# Patient Record
Sex: FEMALE | Race: WHITE | NOT HISPANIC OR LATINO
[De-identification: names, ages, dates, MRNs, and addresses within clinical notes are randomized per-mention and may not be internally consistent; named-entity substitution may affect disease eponyms.]

---

## 2024-03-12 ENCOUNTER — APPOINTMENT (OUTPATIENT)
Dept: ORTHOPEDIC SURGERY | Facility: CLINIC | Age: 54
End: 2024-03-12

## 2024-03-14 PROBLEM — Z00.00 ENCOUNTER FOR PREVENTIVE HEALTH EXAMINATION: Status: ACTIVE | Noted: 2024-03-14

## 2024-03-15 ENCOUNTER — APPOINTMENT (OUTPATIENT)
Dept: ORTHOPEDIC SURGERY | Facility: CLINIC | Age: 54
End: 2024-03-15
Payer: COMMERCIAL

## 2024-03-15 VITALS
DIASTOLIC BLOOD PRESSURE: 73 MMHG | HEART RATE: 67 BPM | WEIGHT: 230 LBS | OXYGEN SATURATION: 95 % | BODY MASS INDEX: 42.33 KG/M2 | SYSTOLIC BLOOD PRESSURE: 121 MMHG | HEIGHT: 62 IN

## 2024-03-15 DIAGNOSIS — M25.462 EFFUSION, LEFT KNEE: ICD-10-CM

## 2024-03-15 PROCEDURE — 99203 OFFICE O/P NEW LOW 30 MIN: CPT | Mod: 25

## 2024-03-15 PROCEDURE — 20611 DRAIN/INJ JOINT/BURSA W/US: CPT | Mod: LT

## 2024-03-15 NOTE — HISTORY OF PRESENT ILLNESS
[de-identified] : CARMELO MELENDREZ is a 53 year old female who presents with left knee pain. States the onset of pain was 4 weeks ago Was caring for her mother who started to fall. Patient grabbed her mother. Felt pain and pop in her left knee. Went to ED at [facility]. XR was negative for fracture. Saw Ortho who aspirated knee (40 mL) and gave her cortisone injection. Prescribed Diclofenac. Pain is anteromedial Pain is nonradiating. There is associated swelling and stiffness There is no associated numbness, paraesthesia or weakness. Exacerbating factors are standing, walking for prolonged periods, climbing and descending stairs, rising from seated position. Patient ambulates independently. Has not tried PT

## 2024-03-15 NOTE — PHYSICAL EXAM
[de-identified] : General: Well-nourished, well-developed, alert, and in no acute distress. Head: Normocephalic. Eyes: Pupils equal, extraocular muscles intact, normal sclera. Nose: No nasal discharge. Cardiovascular: Extremities are warm and well perfused. Distal pulses are symmetric bilaterally. Respiratory: No labored breathing. Extremities: Sensation is intact distally bilaterally. Distal pulses are symmetric bilaterally Lymphatic: No regional lymphadenopathy, no lymphedema Neurologic: No focal deficits Skin: Normal skin color, texture, and turgor Psychiatric: Normal affect  MSK: Examination of [left] knee:   Gait antalgic Genu valgum alignment Moderate effusion Medial soft tissue swelling No erythema, hematoma Tender to palpation: medial joint line, medial soft tissue swelling Nontender to palpation: lateral joint line, medial patellar facet, lateral patellar facet, quad tendon, patellar tendon, pes, Gerdy's tubercle, tibial tuberosity, popliteal fossa, hamstrings, ITB No warmth No Baker's cyst palpable ROM: 0-[100] Mild patellar crepitus   Log roll negative Lachman negative Anterior drawer negative Posterior drawer negative Varus/valgus stress negative at 0 and 30 deg Ant negative Extensor mechanism intact   Examination of [right] knee:   No effusion, erythema, hematoma or skin lesion Nontender to palpation: medial joint line, lateral joint line, medial patellar facet, lateral patellar facet, quad tendon, patellar tendon, pes, Gerdy's tubercle, tibial tuberosity, popliteal fossa, hamstrings, ITB No warmth No Baker's cyst palpable ROM: 0-110 Mild patellar crepitus   Log roll negative Lachman negative Anterior drawer negative Posterior drawer negative Varus/valgus stress negative at 0 and 30 deg Ant negative Extensor mechanism intact   Sensation is intact to light touch over the superficial and deep peroneal nerve distributions and the posterior tibial nerve distribution. Capillary refill is less than two seconds. Posterior tibial and dorsalis pedis pulses 2+ equal bilaterally. No calf swelling or tenderness bilaterally. Strength testing shows hip flexion 5/5, hip adduction 5/5, hip abduction 5/5, knee extension 5/5, knee flexion 5/5, dorsiflexion 5/5, plantar flexion 5/5, EHL 5/5 Reflexes: Patellar 2+, Achilles 2+

## 2024-03-15 NOTE — PROCEDURE
[de-identified] :  Ultrasound guided aspiration and intra-articular steroid injection of left knee:  Following a discussion of the risks (bleeding, infection) and benefits, verbal consent was obtained. Patient placed in supine position. The suprapatellar recess and surrounding structures (patella, femur, quadriceps tendon, suprapatellar fat pad and prefemoral fat pad) were visualized in SAX and LAX with Sonosite 15 Hz linear transducer.   Superolateral knee was anaesthetised with ethyl chloride spray. Under strict sterile technique the right knee was prepped with chlorhexadine. Using ultrasound guidance (superolateral approach) a 25 G 1.5 inch needle was inserted and after negative aspiration, 3mL of 0.5% Bupivacaine and 2mL of 1 % lidocaine was injected subcutaneously and along track into the suprapatellar recess. After adequate anaesthesia, a 20 G 1.5 inch needle was inserted into the suprapatellar recess and after aspiration of 26 mL of clear, serous fluid, 1mL/10mg Dexamethasone, 4mL 0.5% Bupivacaine and 2mL 1% lidocaine was injected intra-articularly.    The use of direct ultrasound visualization was necessary to increase patient safety by identifying and avoiding inadvertent needle placement within the neurovascular and osteochondral structures. Additionally, the increased accuracy of needle placement may improve therapeutic efficacy and allow higher diagnostic specificity when evaluating the effectiveness of this injection.  The patient tolerated the procedure well. Post-injection instructions given (no strenuous activity for 48 hours, ice, elevate). Patient verbalized understanding.

## 2024-03-15 NOTE — ASSESSMENT
[FreeTextEntry1] : CARMELO MELENDREZ is a 53 year old female with left knee pain. I discussed with the patient that their symptoms, signs, and imaging are most consistent with osteoarthritis, possible lipoma and possible meniscus tear. We reviewed the natural history of this condition and treatment options ranging from conservative measures (activity modification, physical therapy, icing, oral anti-inflammatory and/or analgesic medications, steroid injection, HA gel injections, PRP injections) to surgical management. We agreed on the following plan:   XR reviewed with patient today. US guided aspiration and CSI as detailed above. Activity modification: low impact aerobic activity (stationary bike, elliptical, swimming) Recommend 150 min per week of moderate intensity aerobic activity Physical therapy. Referral provided. Medication: Meloxicam prn prescription provided. Kinesio-tape applied  Consider referral to surgery for excision of medial mass/lipoma Advanced imaging: MRI Follow up after imaging.

## 2024-03-20 ENCOUNTER — APPOINTMENT (OUTPATIENT)
Dept: MRI IMAGING | Facility: CLINIC | Age: 54
End: 2024-03-20
Payer: COMMERCIAL

## 2024-03-20 ENCOUNTER — OUTPATIENT (OUTPATIENT)
Dept: OUTPATIENT SERVICES | Facility: HOSPITAL | Age: 54
LOS: 1 days | End: 2024-03-20

## 2024-03-20 PROCEDURE — 73721 MRI JNT OF LWR EXTRE W/O DYE: CPT | Mod: 26,LT

## 2024-03-26 ENCOUNTER — APPOINTMENT (OUTPATIENT)
Dept: ORTHOPEDIC SURGERY | Facility: CLINIC | Age: 54
End: 2024-03-26
Payer: COMMERCIAL

## 2024-03-26 PROCEDURE — 99442: CPT

## 2024-03-26 RX ORDER — HYALURONATE SODIUM, STABILIZED 60 MG/3 ML
60 SYRINGE (ML) INTRAARTICULAR
Qty: 1 | Refills: 0 | Status: ACTIVE | COMMUNITY
Start: 2024-03-26

## 2024-03-26 RX ORDER — MELOXICAM 15 MG/1
15 TABLET ORAL DAILY
Qty: 30 | Refills: 1 | Status: ACTIVE | COMMUNITY
Start: 2024-03-26 | End: 1900-01-01

## 2024-03-30 NOTE — HISTORY OF PRESENT ILLNESS
[de-identified] : CARMELO MELENDREZ is a 53 year old female presents to follow up with left knee pain. Patient consents to telephone consult. 2-point identification verification confirmed. Last visit was 03/15/24 at which time patient had US guided aspiration and CSI, was referred for MRI, advised to start home exercises and PT. MRI detailed below. Pain is ongoing. Injection did not relieve her pain.

## 2024-03-30 NOTE — PHYSICAL EXAM
[de-identified] : MRI left knee (3/20/24): There is horizontal cleavage tear and intrameniscal cystic degeneration of the lateral meniscus with a very large radial tear of the body segment. There is discoid configuration of the lateral meniscus. There is focal high-grade partial-thickness loss of the weightbearing lateral femoral condyle spanning about 9 mm. There is low-grade partial-thickness loss of the opposing lateral tibial plateau spanning about 2 mm. There is reactive marrow edema within the weightbearing lateral femoral condyle and opposing lateral tibial plateau. There is a possible very subtle stress fracture of the medial most aspect of the weightbearing lateral femoral condyle.

## 2024-03-30 NOTE — CONSULT LETTER
[Dear  ___] : Dear  [unfilled], [Consult Closing:] : Thank you very much for allowing me to participate in the care of this patient.  If you have any questions, please do not hesitate to contact me. [Consult Letter:] : I had the pleasure of evaluating your patient, [unfilled]. [Please see my note below.] : Please see my note below. [FreeTextEntry3] : Rosmery Castle MD  [Sincerely,] : Sincerely,

## 2024-03-30 NOTE — ASSESSMENT
[FreeTextEntry1] : CARMELO MELENDREZ is a 53 year old female with left knee pain. I discussed with the patient that their symptoms, signs, and imaging are most consistent with meniscus tear and osteoarthritis. We reviewed the natural history of this condition and treatment options. We agreed on the following plan:  MRI reviewed with patient today. Encouraged to continue home exercises per handout. Continue physical therapy. Medication: Meloxicam prn prescription provided. Discussed surgical options: meniscectomy, meniscal repair, or TKA. Repeat XR bilateral knee next visit. Prior images at [facility]. Will place order for HA gel injection. Patient will be notified once authorized to schedule appointment

## 2024-04-02 ENCOUNTER — NON-APPOINTMENT (OUTPATIENT)
Age: 54
End: 2024-04-02

## 2024-04-02 DIAGNOSIS — M84.30XA STRESS FRACTURE, UNSPECIFIED SITE, INITIAL ENCOUNTER FOR FRACTURE: ICD-10-CM

## 2024-04-02 DIAGNOSIS — R22.9 LOCALIZED SWELLING, MASS AND LUMP, UNSPECIFIED: ICD-10-CM

## 2024-04-02 DIAGNOSIS — S83.242S OTHER TEAR OF MEDIAL MENISCUS, CURRENT INJURY, LEFT KNEE, SEQUELA: ICD-10-CM

## 2024-04-02 DIAGNOSIS — M17.12 UNILATERAL PRIMARY OSTEOARTHRITIS, LEFT KNEE: ICD-10-CM

## 2024-04-10 RX ORDER — TRAMADOL HYDROCHLORIDE 50 MG/1
50 TABLET, COATED ORAL
Qty: 30 | Refills: 0 | Status: ACTIVE | COMMUNITY
Start: 2024-04-10 | End: 1900-01-01

## 2024-05-16 ENCOUNTER — OUTPATIENT (OUTPATIENT)
Dept: OUTPATIENT SERVICES | Facility: HOSPITAL | Age: 54
LOS: 1 days | End: 2024-05-16
Payer: COMMERCIAL

## 2024-05-16 ENCOUNTER — APPOINTMENT (OUTPATIENT)
Dept: ORTHOPEDIC SURGERY | Facility: CLINIC | Age: 54
End: 2024-05-16
Payer: COMMERCIAL

## 2024-05-16 ENCOUNTER — RESULT REVIEW (OUTPATIENT)
Age: 54
End: 2024-05-16

## 2024-05-16 VITALS — HEART RATE: 74 BPM | BODY MASS INDEX: 42.33 KG/M2 | WEIGHT: 230 LBS | HEIGHT: 62 IN | OXYGEN SATURATION: 96 %

## 2024-05-16 DIAGNOSIS — M77.12 LATERAL EPICONDYLITIS, LEFT ELBOW: ICD-10-CM

## 2024-05-16 PROCEDURE — 73070 X-RAY EXAM OF ELBOW: CPT | Mod: LT

## 2024-05-16 PROCEDURE — 99214 OFFICE O/P EST MOD 30 MIN: CPT

## 2024-05-16 PROCEDURE — 73070 X-RAY EXAM OF ELBOW: CPT

## 2024-05-16 PROCEDURE — 73070 X-RAY EXAM OF ELBOW: CPT | Mod: 26,LT

## 2024-05-17 NOTE — DISCUSSION/SUMMARY

## 2024-05-17 NOTE — ASSESSMENT
[FreeTextEntry1] : CARMELO MELENDREZ is a 54 year old female with left elbow pain. I discussed with the patient that their symptoms, signs, and imaging are most consistent with lateral epicondylitis. We reviewed the natural history of this condition and treatment options ranging from conservative measures (activity modification, physical therapy, icing, oral anti-inflammatory and/or analgesic medications, steroid injection) to surgical management.  We agreed on the following plan:  X-ray taken and reviewed with patient today. Activity modification Start Home Exercises for lateral epicondylitis. Demonstration, resistance band and handout provided.  Physical therapy. Referral provided. Medication: Diclofenac gel as needed, acetaminophen 1 g 3 times daily to 4 times daily. P.o. NSAIDs limited due to prior bariatric surgery and recent meniscal repair. Advanced imaging: consider MRI if no symptomatic improvement. Consider US guided CSI if no symptomatic improvement. Follow up in 6-8 weeks.

## 2024-05-17 NOTE — HISTORY OF PRESENT ILLNESS
[de-identified] : CARMELO MELENDREZ is a 54 year old right handed female presents to follow up Last visit was 03/26/24 Patient present new issue of left elbow pain. States that pain started after patient discontinued anti-inflammatory medication taking for surgery. Patient is s/p left knee arthroscopic meniscus repair. Endorses tenderness to touch and "pulling" sensation at left elbow.. Pain has been awaking patient from sleep. Exacerbated by movement.  Has tried elbow strap without resolution.

## 2024-05-17 NOTE — CONSULT LETTER
[Dear  ___] : Dear  [unfilled], [Consult Letter:] : I had the pleasure of evaluating your patient, [unfilled]. [Please see my note below.] : Please see my note below. [Consult Closing:] : Thank you very much for allowing me to participate in the care of this patient.  If you have any questions, please do not hesitate to contact me. [Sincerely,] : Sincerely, [FreeTextEntry3] : Rosmery Castle MD

## 2024-05-17 NOTE — PHYSICAL EXAM
[de-identified] : General: Well-nourished, well-developed, alert, and in no acute distress. Head: Normocephalic. Eyes: Pupils equal, extraocular muscles intact, normal sclera. Nose: No nasal discharge. Cardiovascular: Extremities are warm and well perfused. Distal pulses are symmetric bilaterally. Respiratory: No labored breathing. Extremities: Sensation is intact distally bilaterally. Distal pulses are symmetric bilaterally Lymphatic: No regional lymphadenopathy, no lymphedema Neurologic: No focal deficits Skin: Normal skin color, texture, and turgor Psychiatric: Normal affect  MSK: Examination of [left] elbow: No erythema, hematoma, ecchymosis, scars, lesions, swelling or deformity. AROM: flexion 150 deg, extension 180 deg, pronation 75 and supination 90 deg No pain at end of ROM No locking or crepitus   Tender to palpation: lateral epicondyle, extensor/supinator mass Nontender to palpation: medial epicondyle, flexor/pronator mass, olecranon, triceps tendon insertion, biceps tendon insertion   Special tests: Valgus stress negative pain, laxity Varus stress negative pain, laxity Milking maneuver negative pain, laxity Cozen's positive [place] positive Nontender to palpation over medial epicondyle with wrist flexion Tinel's sign negative over Ulnar nerve at cubital tunnel   Examination of [right] elbow: No erythema, hematoma, ecchymosis, scars, lesions, swelling or deformity. AROM: flexion 150 deg, extension 180 deg, pronation 75 and supination 90 deg No pain at end of ROM No locking or crepitus   Nontender to palpation: medial epicondyle, flexor/pronator mass, lateral epicondyle, extensor/supinator mass, olecranon, triceps tendon insertion, biceps tendon insertion   Sensation is intact to light touch over the median, radial, and ulnar nerve distributions bilaterally. Capillary refill is less than two seconds. Radial pulses 2+ equal bilaterally. Strength testing shows 5/5 biceps, 5/5 triceps. 5/5 wrist extension, 5/5 intrinsics. Reflexes 2+ biceps, brachioradialis. [place] negative.

## 2024-11-20 ENCOUNTER — OUTPATIENT (OUTPATIENT)
Dept: OUTPATIENT SERVICES | Facility: HOSPITAL | Age: 54
LOS: 1 days | End: 2024-11-20
Payer: COMMERCIAL

## 2024-11-20 ENCOUNTER — APPOINTMENT (OUTPATIENT)
Dept: ORTHOPEDIC SURGERY | Facility: CLINIC | Age: 54
End: 2024-11-20
Payer: COMMERCIAL

## 2024-11-20 ENCOUNTER — RESULT REVIEW (OUTPATIENT)
Age: 54
End: 2024-11-20

## 2024-11-20 VITALS — OXYGEN SATURATION: 97 % | HEART RATE: 66 BPM | DIASTOLIC BLOOD PRESSURE: 67 MMHG | SYSTOLIC BLOOD PRESSURE: 108 MMHG

## 2024-11-20 DIAGNOSIS — M72.2 PLANTAR FASCIAL FIBROMATOSIS: ICD-10-CM

## 2024-11-20 DIAGNOSIS — M65.971 UNSPECIFIED SYNOVITIS AND TENOSYNOVITIS, RIGHT ANK/FT: ICD-10-CM

## 2024-11-20 DIAGNOSIS — M17.12 UNILATERAL PRIMARY OSTEOARTHRITIS, LEFT KNEE: ICD-10-CM

## 2024-11-20 PROCEDURE — 73630 X-RAY EXAM OF FOOT: CPT

## 2024-11-20 PROCEDURE — 73564 X-RAY EXAM KNEE 4 OR MORE: CPT | Mod: 26,RT

## 2024-11-20 PROCEDURE — 73610 X-RAY EXAM OF ANKLE: CPT

## 2024-11-20 PROCEDURE — 73630 X-RAY EXAM OF FOOT: CPT | Mod: RT

## 2024-11-20 PROCEDURE — 73630 X-RAY EXAM OF FOOT: CPT | Mod: 26,LT,76

## 2024-11-20 PROCEDURE — 99214 OFFICE O/P EST MOD 30 MIN: CPT

## 2024-11-20 PROCEDURE — 73564 X-RAY EXAM KNEE 4 OR MORE: CPT

## 2024-11-20 PROCEDURE — 73600 X-RAY EXAM OF ANKLE: CPT | Mod: RT

## 2024-11-20 PROCEDURE — 73610 X-RAY EXAM OF ANKLE: CPT | Mod: 26,RT

## 2024-11-20 RX ORDER — DICLOFENAC SODIUM 10 MG/G
1 GEL TOPICAL
Qty: 1 | Refills: 0 | Status: ACTIVE | COMMUNITY
Start: 2024-11-20 | End: 1900-01-01